# Patient Record
Sex: MALE | Race: WHITE
[De-identification: names, ages, dates, MRNs, and addresses within clinical notes are randomized per-mention and may not be internally consistent; named-entity substitution may affect disease eponyms.]

---

## 2022-04-25 ENCOUNTER — HOSPITAL ENCOUNTER (OUTPATIENT)
Dept: HOSPITAL 76 - DI.S | Age: 27
Discharge: HOME | End: 2022-04-25
Attending: NURSE PRACTITIONER
Payer: SELF-PAY

## 2022-04-25 DIAGNOSIS — M25.421: Primary | ICD-10-CM

## 2022-04-25 DIAGNOSIS — M25.521: ICD-10-CM

## 2022-04-25 NOTE — XRAY REPORT
PROCEDURE:  Elbow 3 View RT

 

INDICATIONS:  RIGHT ELBOW PAIN

 

TECHNIQUE:  3 views of the elbow were acquired.  

 

COMPARISON:  None

 

FINDINGS:  

Bones:  No definite acute fracture is visualized. No dislocations.  No suspicious bony lesions.  

 

Soft tissues: Prominent anterior elbow joint effusion.  No suspicious soft tissue calcifications.  

 

IMPRESSION:  

No definite acute fracture is visualized. No dislocation. However, there is a prominent anterior elbo
w joint effusion. In the setting of trauma, an occult fracture not excluded. Recommend immobilization
 and repeat imaging in 10-14 days.

 

Reviewed by: Wally Salas MD on 4/25/2022 11:23 AM PDT

Approved by: Wally Salas MD on 4/25/2022 11:23 AM PDT

 

 

Station ID:  [id number]

## 2022-05-09 ENCOUNTER — HOSPITAL ENCOUNTER (OUTPATIENT)
Dept: HOSPITAL 76 - DI.S | Age: 27
Discharge: HOME | End: 2022-05-09
Attending: PHYSICIAN ASSISTANT
Payer: SELF-PAY

## 2022-05-09 DIAGNOSIS — M25.521: ICD-10-CM

## 2022-05-09 DIAGNOSIS — M25.421: ICD-10-CM

## 2022-05-09 DIAGNOSIS — M25.531: Primary | ICD-10-CM

## 2022-05-09 NOTE — XRAY REPORT
PROCEDURE:  Wrist 3 View RT

 

INDICATIONS: PAIN IN RIGHT WRIST

 

TECHNIQUE:  3 views of the wrist were acquired.  

 

COMPARISON:  None

 

FINDINGS:  

 

Bones:  No fractures or dislocations.  No suspicious bony lesions.  

 

Scaphoid view:  Scaphoid is grossly intact.

 

Soft tissues:  No suspicious soft tissue calcifications.  

 

IMPRESSION:  

Unremarkable radiographic examination of right wrist.

 

Reviewed by: Boaz Bird MD on 5/9/2022 2:42 PM PDT

Approved by: Boaz Bird MD on 5/9/2022 2:42 PM PDT

 

 

Station ID:  [id number]

## 2022-05-09 NOTE — XRAY REPORT
PROCEDURE:  Elbow 3 View RT

 

INDICATIONS:  PAIN IN RIGHT ELBOW

 

TECHNIQUE:  3 views of the elbow were acquired.  

 

COMPARISON:  Right elbow radiographs 4/25/2022

 

FINDINGS:  

Bones:  No acute fractures or dislocations.  No suspicious bony lesions.  

 

Soft tissues: Persistent elbow joint effusion is noted that does not appear significantly changed whe
n compared to the prior exam..  No suspicious soft tissue calcifications.  

 

IMPRESSION:  

No acute or healing osseous fracture identified. However, there is a persistent elbow joint effusion,
 and occult injury cannot be excluded. MRI could be performed for further evaluation if indicated cli
nically.

 

Reviewed by: Rufino Miller MD on 5/9/2022 11:23 AM PDT

Approved by: Rufino Miller MD on 5/9/2022 11:23 AM PDT

 

 

Station ID:  [id number]